# Patient Record
Sex: FEMALE | Race: WHITE | Employment: OTHER | URBAN - METROPOLITAN AREA
[De-identification: names, ages, dates, MRNs, and addresses within clinical notes are randomized per-mention and may not be internally consistent; named-entity substitution may affect disease eponyms.]

---

## 2017-02-03 PROBLEM — G89.29 CHRONIC LEFT-SIDED LOW BACK PAIN WITH LEFT-SIDED SCIATICA: Status: ACTIVE | Noted: 2017-02-03

## 2017-02-03 PROBLEM — M54.42 CHRONIC LEFT-SIDED LOW BACK PAIN WITH LEFT-SIDED SCIATICA: Status: ACTIVE | Noted: 2017-02-03

## 2017-05-17 PROBLEM — M71.38 SYNOVIAL CYST OF LUMBAR FACET JOINT: Status: ACTIVE | Noted: 2017-05-17

## 2017-05-17 PROBLEM — M48.061 SPINAL STENOSIS OF LUMBAR REGION WITH RADICULOPATHY: Status: ACTIVE | Noted: 2017-05-17

## 2017-05-17 PROBLEM — M54.16 SPINAL STENOSIS OF LUMBAR REGION WITH RADICULOPATHY: Status: ACTIVE | Noted: 2017-05-17

## 2017-06-05 PROCEDURE — 87086 URINE CULTURE/COLONY COUNT: CPT | Performed by: FAMILY MEDICINE

## 2017-06-05 PROCEDURE — 87081 CULTURE SCREEN ONLY: CPT | Performed by: FAMILY MEDICINE

## 2017-06-05 PROCEDURE — 81001 URINALYSIS AUTO W/SCOPE: CPT | Performed by: FAMILY MEDICINE

## 2017-06-16 ENCOUNTER — ANESTHESIA EVENT (OUTPATIENT)
Dept: SURGERY | Facility: HOSPITAL | Age: 67
End: 2017-06-16
Payer: MEDICARE

## 2017-06-16 ENCOUNTER — APPOINTMENT (OUTPATIENT)
Dept: GENERAL RADIOLOGY | Facility: HOSPITAL | Age: 67
End: 2017-06-16
Attending: ORTHOPAEDIC SURGERY
Payer: MEDICARE

## 2017-06-16 ENCOUNTER — HOSPITAL ENCOUNTER (OUTPATIENT)
Facility: HOSPITAL | Age: 67
Setting detail: HOSPITAL OUTPATIENT SURGERY
Discharge: HOME | End: 2017-06-16
Attending: ORTHOPAEDIC SURGERY | Admitting: ORTHOPAEDIC SURGERY
Payer: MEDICARE

## 2017-06-16 ENCOUNTER — SURGERY (OUTPATIENT)
Age: 67
End: 2017-06-16

## 2017-06-16 ENCOUNTER — ANESTHESIA (OUTPATIENT)
Dept: SURGERY | Facility: HOSPITAL | Age: 67
End: 2017-06-16
Payer: MEDICARE

## 2017-06-16 VITALS
TEMPERATURE: 98 F | RESPIRATION RATE: 14 BRPM | DIASTOLIC BLOOD PRESSURE: 68 MMHG | HEART RATE: 68 BPM | OXYGEN SATURATION: 98 % | HEIGHT: 63 IN | BODY MASS INDEX: 25.16 KG/M2 | SYSTOLIC BLOOD PRESSURE: 125 MMHG | WEIGHT: 142 LBS

## 2017-06-16 DIAGNOSIS — M48.061 SPINAL STENOSIS OF LUMBAR REGION WITH RADICULOPATHY: ICD-10-CM

## 2017-06-16 DIAGNOSIS — M54.16 SPINAL STENOSIS OF LUMBAR REGION WITH RADICULOPATHY: ICD-10-CM

## 2017-06-16 DIAGNOSIS — M71.38 SYNOVIAL CYST OF LUMBAR FACET JOINT: Primary | ICD-10-CM

## 2017-06-16 PROCEDURE — 72020 X-RAY EXAM OF SPINE 1 VIEW: CPT | Performed by: ORTHOPAEDIC SURGERY

## 2017-06-16 PROCEDURE — 95860 NEEDLE EMG 1 EXTREMITY: CPT | Performed by: ORTHOPAEDIC SURGERY

## 2017-06-16 PROCEDURE — 95938 SOMATOSENSORY TESTING: CPT | Performed by: ORTHOPAEDIC SURGERY

## 2017-06-16 PROCEDURE — 88304 TISSUE EXAM BY PATHOLOGIST: CPT | Performed by: ORTHOPAEDIC SURGERY

## 2017-06-16 PROCEDURE — 88311 DECALCIFY TISSUE: CPT | Performed by: ORTHOPAEDIC SURGERY

## 2017-06-16 PROCEDURE — 76000 FLUOROSCOPY <1 HR PHYS/QHP: CPT | Performed by: ORTHOPAEDIC SURGERY

## 2017-06-16 PROCEDURE — 00BY0ZX EXCISION OF LUMBAR SPINAL CORD, OPEN APPROACH, DIAGNOSTIC: ICD-10-PCS | Performed by: ORTHOPAEDIC SURGERY

## 2017-06-16 RX ORDER — ACETAMINOPHEN 325 MG/1
650 TABLET ORAL ONCE
Status: COMPLETED | OUTPATIENT
Start: 2017-06-16 | End: 2017-06-16

## 2017-06-16 RX ORDER — DIAZEPAM 5 MG/ML
2.5 INJECTION, SOLUTION INTRAMUSCULAR; INTRAVENOUS EVERY 6 HOURS PRN
Status: DISCONTINUED | OUTPATIENT
Start: 2017-06-16 | End: 2017-06-16

## 2017-06-16 RX ORDER — HALOPERIDOL 5 MG/ML
0.25 INJECTION INTRAMUSCULAR ONCE AS NEEDED
Status: DISCONTINUED | OUTPATIENT
Start: 2017-06-16 | End: 2017-06-16

## 2017-06-16 RX ORDER — MORPHINE SULFATE 10 MG/ML
6 INJECTION, SOLUTION INTRAMUSCULAR; INTRAVENOUS EVERY 10 MIN PRN
Status: DISCONTINUED | OUTPATIENT
Start: 2017-06-16 | End: 2017-06-16

## 2017-06-16 RX ORDER — SODIUM CHLORIDE, SODIUM LACTATE, POTASSIUM CHLORIDE, CALCIUM CHLORIDE 600; 310; 30; 20 MG/100ML; MG/100ML; MG/100ML; MG/100ML
INJECTION, SOLUTION INTRAVENOUS CONTINUOUS
Status: DISCONTINUED | OUTPATIENT
Start: 2017-06-16 | End: 2017-06-16

## 2017-06-16 RX ORDER — NEOSTIGMINE METHYLSULFATE 0.5 MG/ML
INJECTION INTRAVENOUS AS NEEDED
Status: DISCONTINUED | OUTPATIENT
Start: 2017-06-16 | End: 2017-06-16 | Stop reason: SURG

## 2017-06-16 RX ORDER — ONDANSETRON 2 MG/ML
4 INJECTION INTRAMUSCULAR; INTRAVENOUS ONCE AS NEEDED
Status: DISCONTINUED | OUTPATIENT
Start: 2017-06-16 | End: 2017-06-16

## 2017-06-16 RX ORDER — HYDROCODONE BITARTRATE AND ACETAMINOPHEN 5; 325 MG/1; MG/1
2 TABLET ORAL AS NEEDED
Status: DISCONTINUED | OUTPATIENT
Start: 2017-06-16 | End: 2017-06-16

## 2017-06-16 RX ORDER — HYDROMORPHONE HYDROCHLORIDE 1 MG/ML
0.2 INJECTION, SOLUTION INTRAMUSCULAR; INTRAVENOUS; SUBCUTANEOUS EVERY 5 MIN PRN
Status: DISCONTINUED | OUTPATIENT
Start: 2017-06-16 | End: 2017-06-16

## 2017-06-16 RX ORDER — DEXAMETHASONE SODIUM PHOSPHATE 4 MG/ML
VIAL (ML) INJECTION AS NEEDED
Status: DISCONTINUED | OUTPATIENT
Start: 2017-06-16 | End: 2017-06-16 | Stop reason: SURG

## 2017-06-16 RX ORDER — MORPHINE SULFATE 2 MG/ML
2 INJECTION, SOLUTION INTRAMUSCULAR; INTRAVENOUS EVERY 10 MIN PRN
Status: DISCONTINUED | OUTPATIENT
Start: 2017-06-16 | End: 2017-06-16

## 2017-06-16 RX ORDER — LIDOCAINE HYDROCHLORIDE 10 MG/ML
INJECTION, SOLUTION EPIDURAL; INFILTRATION; INTRACAUDAL; PERINEURAL AS NEEDED
Status: DISCONTINUED | OUTPATIENT
Start: 2017-06-16 | End: 2017-06-16 | Stop reason: SURG

## 2017-06-16 RX ORDER — SUCCINYLCHOLINE CHLORIDE 20 MG/ML
INJECTION INTRAMUSCULAR; INTRAVENOUS AS NEEDED
Status: DISCONTINUED | OUTPATIENT
Start: 2017-06-16 | End: 2017-06-16 | Stop reason: SURG

## 2017-06-16 RX ORDER — SODIUM CHLORIDE 9 MG/ML
INJECTION, SOLUTION INTRAVENOUS CONTINUOUS PRN
Status: DISCONTINUED | OUTPATIENT
Start: 2017-06-16 | End: 2017-06-16 | Stop reason: SURG

## 2017-06-16 RX ORDER — DIPHENHYDRAMINE HYDROCHLORIDE 50 MG/ML
INJECTION INTRAMUSCULAR; INTRAVENOUS AS NEEDED
Status: DISCONTINUED | OUTPATIENT
Start: 2017-06-16 | End: 2017-06-16 | Stop reason: SURG

## 2017-06-16 RX ORDER — ROCURONIUM BROMIDE 10 MG/ML
INJECTION, SOLUTION INTRAVENOUS AS NEEDED
Status: DISCONTINUED | OUTPATIENT
Start: 2017-06-16 | End: 2017-06-16 | Stop reason: SURG

## 2017-06-16 RX ORDER — BUPIVACAINE HYDROCHLORIDE 2.5 MG/ML
INJECTION, SOLUTION EPIDURAL; INFILTRATION; INTRACAUDAL AS NEEDED
Status: DISCONTINUED | OUTPATIENT
Start: 2017-06-16 | End: 2017-06-16 | Stop reason: HOSPADM

## 2017-06-16 RX ORDER — METOCLOPRAMIDE 10 MG/1
10 TABLET ORAL ONCE
Status: DISCONTINUED | OUTPATIENT
Start: 2017-06-16 | End: 2017-06-16 | Stop reason: HOSPADM

## 2017-06-16 RX ORDER — LABETALOL HYDROCHLORIDE 5 MG/ML
INJECTION, SOLUTION INTRAVENOUS AS NEEDED
Status: DISCONTINUED | OUTPATIENT
Start: 2017-06-16 | End: 2017-06-16 | Stop reason: SURG

## 2017-06-16 RX ORDER — HYDROCODONE BITARTRATE AND ACETAMINOPHEN 10; 325 MG/1; MG/1
2 TABLET ORAL EVERY 6 HOURS PRN
Status: DISCONTINUED | OUTPATIENT
Start: 2017-06-16 | End: 2017-06-16

## 2017-06-16 RX ORDER — HYDROCODONE BITARTRATE AND ACETAMINOPHEN 5; 325 MG/1; MG/1
1 TABLET ORAL AS NEEDED
Status: DISCONTINUED | OUTPATIENT
Start: 2017-06-16 | End: 2017-06-16

## 2017-06-16 RX ORDER — FAMOTIDINE 20 MG/1
20 TABLET ORAL ONCE
Status: DISCONTINUED | OUTPATIENT
Start: 2017-06-16 | End: 2017-06-16 | Stop reason: HOSPADM

## 2017-06-16 RX ORDER — HYDROMORPHONE HYDROCHLORIDE 1 MG/ML
0.6 INJECTION, SOLUTION INTRAMUSCULAR; INTRAVENOUS; SUBCUTANEOUS EVERY 5 MIN PRN
Status: DISCONTINUED | OUTPATIENT
Start: 2017-06-16 | End: 2017-06-16

## 2017-06-16 RX ORDER — NALOXONE HYDROCHLORIDE 0.4 MG/ML
80 INJECTION, SOLUTION INTRAMUSCULAR; INTRAVENOUS; SUBCUTANEOUS AS NEEDED
Status: DISCONTINUED | OUTPATIENT
Start: 2017-06-16 | End: 2017-06-16

## 2017-06-16 RX ORDER — MIDAZOLAM HYDROCHLORIDE 1 MG/ML
INJECTION INTRAMUSCULAR; INTRAVENOUS AS NEEDED
Status: DISCONTINUED | OUTPATIENT
Start: 2017-06-16 | End: 2017-06-16 | Stop reason: SURG

## 2017-06-16 RX ORDER — MORPHINE SULFATE 4 MG/ML
4 INJECTION, SOLUTION INTRAMUSCULAR; INTRAVENOUS EVERY 10 MIN PRN
Status: DISCONTINUED | OUTPATIENT
Start: 2017-06-16 | End: 2017-06-16

## 2017-06-16 RX ORDER — HYDROCODONE BITARTRATE AND ACETAMINOPHEN 10; 325 MG/1; MG/1
1 TABLET ORAL EVERY 4 HOURS PRN
Status: DISCONTINUED | OUTPATIENT
Start: 2017-06-16 | End: 2017-06-16

## 2017-06-16 RX ORDER — HYDROMORPHONE HYDROCHLORIDE 1 MG/ML
0.4 INJECTION, SOLUTION INTRAMUSCULAR; INTRAVENOUS; SUBCUTANEOUS EVERY 5 MIN PRN
Status: DISCONTINUED | OUTPATIENT
Start: 2017-06-16 | End: 2017-06-16

## 2017-06-16 RX ORDER — ONDANSETRON 2 MG/ML
INJECTION INTRAMUSCULAR; INTRAVENOUS AS NEEDED
Status: DISCONTINUED | OUTPATIENT
Start: 2017-06-16 | End: 2017-06-16 | Stop reason: SURG

## 2017-06-16 RX ORDER — GLYCOPYRROLATE 0.2 MG/ML
INJECTION INTRAMUSCULAR; INTRAVENOUS AS NEEDED
Status: DISCONTINUED | OUTPATIENT
Start: 2017-06-16 | End: 2017-06-16 | Stop reason: SURG

## 2017-06-16 RX ADMIN — SODIUM CHLORIDE, SODIUM LACTATE, POTASSIUM CHLORIDE, CALCIUM CHLORIDE: 600; 310; 30; 20 INJECTION, SOLUTION INTRAVENOUS at 14:50:00

## 2017-06-16 RX ADMIN — DIPHENHYDRAMINE HYDROCHLORIDE 25 MG: 50 INJECTION INTRAMUSCULAR; INTRAVENOUS at 13:00:00

## 2017-06-16 RX ADMIN — MIDAZOLAM HYDROCHLORIDE 1 MG: 1 INJECTION INTRAMUSCULAR; INTRAVENOUS at 13:26:00

## 2017-06-16 RX ADMIN — NEOSTIGMINE METHYLSULFATE 3 MG: 0.5 INJECTION INTRAVENOUS at 14:50:00

## 2017-06-16 RX ADMIN — SODIUM CHLORIDE: 9 INJECTION, SOLUTION INTRAVENOUS at 14:00:00

## 2017-06-16 RX ADMIN — ONDANSETRON 4 MG: 2 INJECTION INTRAMUSCULAR; INTRAVENOUS at 14:50:00

## 2017-06-16 RX ADMIN — SODIUM CHLORIDE, SODIUM LACTATE, POTASSIUM CHLORIDE, CALCIUM CHLORIDE: 600; 310; 30; 20 INJECTION, SOLUTION INTRAVENOUS at 13:26:00

## 2017-06-16 RX ADMIN — DEXAMETHASONE SODIUM PHOSPHATE 4 MG: 4 MG/ML VIAL (ML) INJECTION at 13:35:00

## 2017-06-16 RX ADMIN — ROCURONIUM BROMIDE 25 MG: 10 INJECTION, SOLUTION INTRAVENOUS at 13:40:00

## 2017-06-16 RX ADMIN — LIDOCAINE HYDROCHLORIDE 50 MG: 10 INJECTION, SOLUTION EPIDURAL; INFILTRATION; INTRACAUDAL; PERINEURAL at 13:28:00

## 2017-06-16 RX ADMIN — LABETALOL HYDROCHLORIDE 5 MG: 5 INJECTION, SOLUTION INTRAVENOUS at 14:05:00

## 2017-06-16 RX ADMIN — SUCCINYLCHOLINE CHLORIDE 120 MG: 20 INJECTION INTRAMUSCULAR; INTRAVENOUS at 13:28:00

## 2017-06-16 RX ADMIN — ROCURONIUM BROMIDE 5 MG: 10 INJECTION, SOLUTION INTRAVENOUS at 13:28:00

## 2017-06-16 RX ADMIN — GLYCOPYRROLATE 0.6 MG: 0.2 INJECTION INTRAMUSCULAR; INTRAVENOUS at 14:50:00

## 2017-06-16 NOTE — OPERATIVE REPORT
[facility] AND [facility]   PATIENT'S NAME:  Venkata Kline   ATTENDING PHYSICIAN:  Joe Roberto M.D. OPERATING PHYSICIAN:  Joe Roberto M.D.     PATIENT CSN#: [id number]  MEDICAL RECORD #:  [id number]  YOB: 1950  ADMISSION DATE: 6/16/2017  OPERATION the incision. We then made incision on the skin and used a Bovie cautery to expose down to the fascial layer on the bilateral aspect of the lamina. Using the Bovie, we retracted the muscular layer off the lamina using a Angle.  We placed our retractor in pos

## 2017-06-16 NOTE — OR PREOP
Patient wound continues to drain, called Dr Mark Edwards, order to instruct patient to use ice, can change dressing after 48, will supply ABD and tape, per Dr Mark Edwards. Patient to call MD if drainage starts to leak past dressing.

## 2017-06-16 NOTE — BRIEF OP NOTE
Pre-Operative Diagnosis: Synovial cyst of lumbar facet joint, lumbar stenosis with radiculopathy      Post-Operative Diagnosis: Synovial cyst of lumbar facet joint, lumbar stenosis with radiculopathy      Procedure Performed:   Procedure(s):  Lumbar 5 t

## 2017-06-16 NOTE — H&P
HISTORY OF CHIEF COMPLAINT:     Simon Almeida is a 74year old female, who is here for preop visit. Her pain in the left leg has returned.    She has mostly Left  leg pain which started about a year ago. Ashley Villatoro  Specific injury or accident: No.    Aggravated by be   OTHER SURGICAL HISTORY          Comment  stress echocardiogram '98 - normal      COLONOSCOPY    6/30/14= Diverticulosis, Hemorrhoids      Comment  Repeat 2024      COLONOSCOPY,DIAGNOSTIC  N/A  6/30/2014      Comment  Procedure: COLONOSCOPY, POSSIBLE BIOP TiZANidine HCl 4 MG Oral Tab  Take 1 tablet (4 mg total) by mouth every 6 (six) hours as needed.  Disp: 50 tablet  Rfl: 0    docusate sodium (COLACE) 100 MG Oral Cap  Take 1 capsule (100 mg total) by mouth 2 (two) times daily.  Disp: 60 capsule  Rfl: 0      PHYSICAL EXAMIMATION    PHYSICAL EXAMINATION: Joanne Bach is a 74year old [address] female who is observed sitting comfortably on a chair in the exam room alert and oriented times three.     She looks consistent with her stated age.    Ht 5' 3\" (1. MUSCULOSKELETAL: Fluid, pain-free ROM to bilateral: knees, ankles and hips     IMAGING STUDIES:     Lumbar spine radiographs 2016: Moderate degenerative changes noted.   No listhesis noted, no lysis  No fractures but upper endplate deformity of L5 noted  S Awaiting final heart scan tomorrow, pending clearance    The patient indicates understanding of these issues and agrees to the plan. Thank you for the opportunity to help care for your patient.         Vanessa De Leon MD  Orthopaedic Spine Surgery  [facility]

## 2017-06-16 NOTE — ANESTHESIA PREPROCEDURE EVALUATION
Anesthesia PreOp Note    HPI:     Nav Saavedra is a 74year old female who presents for preoperative consultation requested by: Jennifer Aldana MD    Date of Surgery: 6/16/2017    Procedure(s):  LUMBAR LAMINECTOMY 1 LEVEL  Indication: Synovial cyst of lumbar SURGICAL HISTORY      Comment brain surgery for aneurysm repair    OTHER SURGICAL HISTORY      Comment brain angiogram -2006 (f/u CT scan from brain aneurysm was suspicous for another aneurysm but angiogram was negative)    OTHER SURGICAL HISTORY      Comm in sodium chloride 0.9 % 250 mL IVPB add-vantage 1,000 mg Intravenous Once Bernice Schafer MD 1,000 mg at 06/16/17 1127     No current Epic-ordered outpatient prescriptions on file. Contact [facility]*        Comment:Allergic to the lens.   Penicillin V 0.80 06/05/2017   GLU 81 06/05/2017   CA 9.4 06/05/2017       Lab Results  Component Value Date   INR 0.9 06/05/2017       Vital Signs: Body mass index is 25.16 kg/(m^2). height is 1.6 m (5' 3\") and weight is 64.411 kg (142 lb).  Her oral temperature is

## 2017-06-16 NOTE — ANESTHESIA POSTPROCEDURE EVALUATION
Patient: Maylin Mccurdy    Procedure Summary     Date Anesthesia Start Anesthesia Stop Room / Location    06/16/17 1326  [facility] MAIN OR 09 / 300 [facility] MAIN OR       Procedure Diagnosis Surgeon Responsible Provider    LUMBAR LAMINECTOMY 1 LEVEL (Left Spine Lumbar) (Sy

## 2017-06-16 NOTE — INTERVAL H&P NOTE
Pre-op Diagnosis: Synovial cyst of lumbar facet joint, lumbar stenosis with radiculopathy     The above referenced H&P was reviewed by Roosevelt Szymanski PA-C on 6/16/2017, the patient was examined and no significant changes have occurred in the patient's cond

## 2017-08-09 PROBLEM — Z98.890 STATUS POST LUMBAR SPINE SURGERY FOR DECOMPRESSION OF SPINAL CORD: Status: ACTIVE | Noted: 2017-08-09

## 2017-08-09 PROBLEM — M46.1 SACROILIAC INFLAMMATION (HCC): Status: ACTIVE | Noted: 2017-08-09

## 2017-10-31 PROBLEM — H47.391 MYELINATED NERVE FIBERS OF OPTIC DISC OF RIGHT EYE: Status: ACTIVE | Noted: 2017-10-31

## 2017-10-31 PROBLEM — H52.7 REFRACTIVE ERROR: Status: ACTIVE | Noted: 2017-10-31

## 2017-10-31 PROBLEM — H40.003 GLAUCOMA SUSPECT OF BOTH EYES: Status: ACTIVE | Noted: 2017-10-31

## 2017-10-31 PROBLEM — H43.811 SYMPTOMATIC POSTERIOR VITREOUS DETACHMENT OF RIGHT EYE: Status: ACTIVE | Noted: 2017-10-31

## 2017-10-31 PROBLEM — H26.9 INCIPIENT CATARACT OF BOTH EYES: Status: ACTIVE | Noted: 2017-10-31

## 2018-02-05 PROBLEM — H04.123 DRY EYE SYNDROME OF BILATERAL LACRIMAL GLANDS: Status: ACTIVE | Noted: 2018-02-05

## 2018-02-05 PROBLEM — H02.886 MEIBOMIAN GLAND DYSFUNCTION (MGD) OF BOTH EYES: Status: ACTIVE | Noted: 2018-02-05

## 2018-02-05 PROBLEM — H40.033 ANATOMICAL NARROW ANGLE, BILATERAL: Status: ACTIVE | Noted: 2018-02-05

## 2018-02-05 PROBLEM — H02.883 MEIBOMIAN GLAND DYSFUNCTION (MGD) OF BOTH EYES: Status: ACTIVE | Noted: 2018-02-05

## 2018-02-08 PROBLEM — H52.7 REFRACTIVE ERROR: Status: RESOLVED | Noted: 2017-10-31 | Resolved: 2018-02-08

## 2018-02-08 PROCEDURE — 87502 INFLUENZA DNA AMP PROBE: CPT | Performed by: FAMILY MEDICINE

## 2018-02-08 PROCEDURE — 87798 DETECT AGENT NOS DNA AMP: CPT | Performed by: FAMILY MEDICINE

## 2018-02-08 PROCEDURE — 36415 COLL VENOUS BLD VENIPUNCTURE: CPT | Performed by: FAMILY MEDICINE

## 2018-02-09 PROBLEM — R09.82 POST-NASAL DRAINAGE: Status: ACTIVE | Noted: 2018-02-09

## 2018-08-24 PROBLEM — I60.10: Status: ACTIVE | Noted: 2018-08-24

## 2018-11-19 PROBLEM — K57.31 DIVERTICULOSIS OF LARGE INTESTINE WITH HEMORRHAGE: Status: ACTIVE | Noted: 2018-11-19

## 2019-02-06 PROBLEM — I77.9 BILATERAL CAROTID ARTERY DISEASE (HCC): Status: ACTIVE | Noted: 2019-02-06

## 2019-02-17 PROBLEM — M46.1 SACROILIAC INFLAMMATION (HCC): Status: RESOLVED | Noted: 2017-08-09 | Resolved: 2019-02-17

## 2019-08-02 PROBLEM — J44.9 COPD (CHRONIC OBSTRUCTIVE PULMONARY DISEASE) (HCC): Status: ACTIVE | Noted: 2019-08-02

## 2019-08-06 PROBLEM — I60.10: Status: RESOLVED | Noted: 2018-08-24 | Resolved: 2019-08-06

## 2019-08-06 PROBLEM — M71.38 SYNOVIAL CYST OF LUMBAR FACET JOINT: Status: RESOLVED | Noted: 2017-05-17 | Resolved: 2019-08-06

## 2019-08-06 PROBLEM — M54.16 SPINAL STENOSIS OF LUMBAR REGION WITH RADICULOPATHY: Status: RESOLVED | Noted: 2017-05-17 | Resolved: 2019-08-06

## 2019-08-06 PROBLEM — M48.061 SPINAL STENOSIS OF LUMBAR REGION WITH RADICULOPATHY: Status: RESOLVED | Noted: 2017-05-17 | Resolved: 2019-08-06

## 2019-08-06 PROBLEM — J44.9 COPD (CHRONIC OBSTRUCTIVE PULMONARY DISEASE) (HCC): Status: RESOLVED | Noted: 2019-08-02 | Resolved: 2019-08-06

## 2020-02-13 PROBLEM — E55.9 VITAMIN D DEFICIENCY: Status: ACTIVE | Noted: 2020-02-13

## 2020-02-16 PROBLEM — A04.71 RECURRENT COLITIS DUE TO CLOSTRIDIUM DIFFICILE: Status: ACTIVE | Noted: 2020-02-16

## 2020-02-16 PROBLEM — G54.0 THORACIC OUTLET SYNDROME: Status: ACTIVE | Noted: 2020-02-16

## 2020-10-26 PROCEDURE — 88305 TISSUE EXAM BY PATHOLOGIST: CPT | Performed by: INTERNAL MEDICINE

## 2021-02-15 PROBLEM — I65.23 CAROTID ATHEROSCLEROSIS, BILATERAL: Status: ACTIVE | Noted: 2021-02-15

## 2021-04-13 PROCEDURE — 88305 TISSUE EXAM BY PATHOLOGIST: CPT | Performed by: RADIOLOGY

## (undated) DEVICE — GOWN SURG AERO BLUE PERF LG

## (undated) DEVICE — SOL  .9 1000ML BAG

## (undated) DEVICE — SUTURE PDS II 0 CT-1

## (undated) DEVICE — GAUZE SPONGES,12 PLY: Brand: CURITY

## (undated) DEVICE — COVER PSTN BW FRM SKN CR KT

## (undated) DEVICE — 3.0MM PRECISION NEURO (MATCH HEAD)

## (undated) DEVICE — E-Z CLEAN, NON-STICK, PTFE COATED, ELECTROSURGICAL BLADE ELECTRODE, MODIFIED EXTENDED INSULATION, 2.5 INCH (6.35 CM): Brand: MEGADYNE

## (undated) DEVICE — GOWN SURG AERO BLUE PERF XLG

## (undated) DEVICE — SUTURE MONOCRYL 3-0 Y936H

## (undated) DEVICE — STERILE LATEX POWDER-FREE SURGICAL GLOVESWITH NITRILE COATING: Brand: PROTEXIS

## (undated) DEVICE — STERILE POLYISOPRENE POWDER-FREE SURGICAL GLOVES: Brand: PROTEXIS

## (undated) DEVICE — DRESSING AQUACEL AG 3.5 X 6

## (undated) DEVICE — CUSHION INSERT PRONEVIEW LG

## (undated) DEVICE — SUTURE VICRYL 0 CT-1

## (undated) DEVICE — DRAPE SHEET LG

## (undated) DEVICE — CAUTERY TIP TEFLON MEGADYNE

## (undated) DEVICE — WRAP THRP PLRCR500 BCK DRSG

## (undated) DEVICE — SUTURE VICRYL 2-0 CT-2

## (undated) DEVICE — 3M(TM) TEGADERM(TM) TRANSPARENT FILM DRESSING FRAME STYLE 1628: Brand: 3M™ TEGADERM™

## (undated) DEVICE — SOL  .9 1000ML BTL

## (undated) DEVICE — LAMINECTOMY: Brand: MEDLINE INDUSTRIES, INC.

## (undated) NOTE — IP AVS SNAPSHOT
[facility]    [address], [place], [place] ~ [phone]                Discharge Summary   6/16/2017    Nva Saavedra           Admission Information        Provider Department    6/16/2017 Victoria Koroma MD [facility] Pre-Op Yo · That you have someone stay with you on your first night home   · Do not drink alcohol or take sleeping pills or tranquilizers   · Do not sign legal documents within 24 hours of your procedure   · If you had a nerve block for your surgery, take extra care Questionnaires will be mailed to randomly selected surgery patients 30 days after their surgery.       If you receive a questionnaire about your surgery, please take a few minutes to answer the questions and return in the provided self-addressed stamped env comfortable. You should be walking 1-2 miles per day by 2 weeks.   NOTE: If you need to lift or  an object (less than 10 lbs) from the floor, squat with your knees bent, do not bend at your waist.    Limitations  -No driving for 3 days, however you m (especially in the evening). Do not put the ice directly on your skin. Use a ready-made ice pack or put ice in a plastic bag and then wrap pack or bag in a towel before you use it. You may also need to use pain medicine.  If you do need pain medicine, take medications. To make if convenient for you [facility] will work with the pharmacy of your choice. Please choose one pharmacy, and use only that pharmacy. It is an easy way to help protect your health. 3. Call your pharmacy early in the day.  This gives your docto [phone]      Discharge References/Attachments     DISCHARGE INSTRUCTIONS: AFTER YOUR SURGERY (ENGLISH)      Instructions and Information about Your Health     None      Follow-up Information     Follow up with Familia Coles PA-C In 1 week.     Spec 15 (06/05/17)  0.80 (06/05/17)  9.4 (06/05/17)  69 (06/05/17)  23      Metabolic Lab Results  (Last result in the past 90 days)    ALT Bilirubin,Total Total Protein Albumin Sodium Potassium Chloride    (06/05/17)  39 (06/05/17)  0.46 (06/05/17)  7.4 (06/05 Call [phone] for help. [employer] is NOT to be used for urgent needs. For medical emergencies, dial 911.